# Patient Record
Sex: MALE | Race: BLACK OR AFRICAN AMERICAN | NOT HISPANIC OR LATINO | Employment: UNEMPLOYED | URBAN - METROPOLITAN AREA
[De-identification: names, ages, dates, MRNs, and addresses within clinical notes are randomized per-mention and may not be internally consistent; named-entity substitution may affect disease eponyms.]

---

## 2024-06-25 ENCOUNTER — HOSPITAL ENCOUNTER (EMERGENCY)
Facility: HOSPITAL | Age: 24
Discharge: HOME | End: 2024-06-25
Attending: EMERGENCY MEDICINE
Payer: COMMERCIAL

## 2024-06-25 VITALS
HEART RATE: 97 BPM | HEIGHT: 67 IN | TEMPERATURE: 98.6 F | WEIGHT: 125.66 LBS | BODY MASS INDEX: 19.72 KG/M2 | RESPIRATION RATE: 16 BRPM | SYSTOLIC BLOOD PRESSURE: 108 MMHG | DIASTOLIC BLOOD PRESSURE: 60 MMHG | OXYGEN SATURATION: 100 %

## 2024-06-25 DIAGNOSIS — R22.0 FACIAL SWELLING: ICD-10-CM

## 2024-06-25 DIAGNOSIS — K04.7 DENTAL ABSCESS: Primary | ICD-10-CM

## 2024-06-25 PROCEDURE — 2500000004 HC RX 250 GENERAL PHARMACY W/ HCPCS (ALT 636 FOR OP/ED): Performed by: EMERGENCY MEDICINE

## 2024-06-25 PROCEDURE — 99283 EMERGENCY DEPT VISIT LOW MDM: CPT | Mod: 25

## 2024-06-25 PROCEDURE — 2500000001 HC RX 250 WO HCPCS SELF ADMINISTERED DRUGS (ALT 637 FOR MEDICARE OP): Performed by: EMERGENCY MEDICINE

## 2024-06-25 PROCEDURE — 96372 THER/PROPH/DIAG INJ SC/IM: CPT | Performed by: EMERGENCY MEDICINE

## 2024-06-25 RX ORDER — CLINDAMYCIN HYDROCHLORIDE 150 MG/1
150 CAPSULE ORAL 3 TIMES DAILY
Qty: 30 CAPSULE | Refills: 0 | Status: SHIPPED | OUTPATIENT
Start: 2024-06-25 | End: 2024-07-05

## 2024-06-25 RX ORDER — DEXAMETHASONE SODIUM PHOSPHATE 100 MG/10ML
10 INJECTION INTRAMUSCULAR; INTRAVENOUS ONCE
Status: COMPLETED | OUTPATIENT
Start: 2024-06-25 | End: 2024-06-25

## 2024-06-25 RX ORDER — CLINDAMYCIN HYDROCHLORIDE 300 MG/1
300 CAPSULE ORAL ONCE
Status: COMPLETED | OUTPATIENT
Start: 2024-06-25 | End: 2024-06-25

## 2024-06-25 RX ORDER — IBUPROFEN 400 MG/1
400 TABLET ORAL ONCE
Status: COMPLETED | OUTPATIENT
Start: 2024-06-25 | End: 2024-06-25

## 2024-06-25 RX ORDER — METHYLPREDNISOLONE 4 MG/1
TABLET ORAL
Qty: 21 TABLET | Refills: 0 | Status: SHIPPED | OUTPATIENT
Start: 2024-06-25 | End: 2024-07-02

## 2024-06-25 ASSESSMENT — COLUMBIA-SUICIDE SEVERITY RATING SCALE - C-SSRS
1. IN THE PAST MONTH, HAVE YOU WISHED YOU WERE DEAD OR WISHED YOU COULD GO TO SLEEP AND NOT WAKE UP?: NO
2. HAVE YOU ACTUALLY HAD ANY THOUGHTS OF KILLING YOURSELF?: NO
6. HAVE YOU EVER DONE ANYTHING, STARTED TO DO ANYTHING, OR PREPARED TO DO ANYTHING TO END YOUR LIFE?: NO

## 2024-06-25 ASSESSMENT — PAIN DESCRIPTION - PROGRESSION: CLINICAL_PROGRESSION: NOT CHANGED

## 2024-06-25 ASSESSMENT — PAIN DESCRIPTION - LOCATION: LOCATION: FACE

## 2024-06-25 ASSESSMENT — PAIN DESCRIPTION - ORIENTATION: ORIENTATION: LEFT

## 2024-06-25 ASSESSMENT — PAIN DESCRIPTION - ONSET: ONSET: AWAKENED FROM SLEEP

## 2024-06-25 ASSESSMENT — PAIN DESCRIPTION - PAIN TYPE: TYPE: ACUTE PAIN

## 2024-06-25 ASSESSMENT — PAIN - FUNCTIONAL ASSESSMENT: PAIN_FUNCTIONAL_ASSESSMENT: 0-10

## 2024-06-25 ASSESSMENT — PAIN DESCRIPTION - FREQUENCY: FREQUENCY: CONSTANT/CONTINUOUS

## 2024-06-25 ASSESSMENT — PAIN DESCRIPTION - DESCRIPTORS: DESCRIPTORS: ACHING

## 2024-06-25 ASSESSMENT — PAIN SCALES - GENERAL: PAINLEVEL_OUTOF10: 6

## 2024-06-25 NOTE — DISCHARGE INSTRUCTIONS
Follow-up with your primary care physician as needed    Follow-up with your dentist today as previously scheduled    Return to the emergency department sooner with worsening of symptoms or onset of new symptoms      You may take over-the-counter acetaminophen and/or ibuprofen as needed for symptom relief      These are low-cost dental care locations    #1 [facility] phone number [phone]    #2 [facility] [phone]    #3 [facility] [phone]    #4 [facility] [phone]    #5 [facility] [phone]    Outside [facility]: [email or web address].

## 2024-06-25 NOTE — Clinical Note
John Welch was seen and treated in our emergency department on 6/25/2024.  He may return to work on 06/26/2024.       If you have any questions or concerns, please don't hesitate to call.      Guera Lombardi MD

## 2024-06-25 NOTE — ED PROVIDER NOTES
HPI   Chief Complaint   Patient presents with    Oral Swelling     Left sided dental abscess since yesterday       HPI                    Kathi Coma Scale Score: 15                     Patient History   No past medical history on file.  No past surgical history on file.  No family history on file.  Social History     Tobacco Use    Smoking status: Not on file    Smokeless tobacco: Not on file   Substance Use Topics    Alcohol use: Not on file    Drug use: Not on file       Physical Exam   ED Triage Vitals [06/25/24 0834]   Temperature Heart Rate Respirations BP   37 °C (98.6 °F) 97 16 --      Pulse Ox Temp Source Heart Rate Source Patient Position   100 % Oral Monitor Sitting      BP Location FiO2 (%)     Right arm --       Physical Exam    ED Course & MDM   Diagnoses as of 06/25/24 0844   Dental abscess   Facial swelling       Medical Decision Making    The patient is a 24-year-old male presenting to the emergency department for evaluation of a dental abscess.  He states he does have some chronic dental decay and tooth pain.  He states he primarily has pain in the left bottom rear molar.  He states it started worsening yesterday so he made an appointment with the dentist today at noon.  He states that he came to the emergency room for evaluation now because he has had increasing facial swelling overnight.  He states it is on the left side.  He is able to eat, drink and speak without difficulty.  No headache or visual changes.  No fever or chills.  No cough or congestion.  No chest pain or shortness of breath.  No nausea, vomiting or diarrhea.  No urinary complaints.  All pertinent positives and negatives are recorded above.  All other systems reviewed and otherwise negative.  Vital signs within normal limits.  Physical exam with a well-nourished well-developed male in no acute distress.  HEENT exam with edema on the left side of the face and erosion of the left bottom rear molar.  No pooling of secretions.  No  stridor.  He is able to converse without difficulty.  He does not have any evidence of airway compromise or respiratory distress.  Abdominal exam is benign.  He has no gross motor, neurologic or vascular deficits on exam.      No indication for emergent imaging and/or diagnostic testing at this time.      Oral clindamycin, oral ibuprofen and IM Decadron was ordered.      The patient was released in good condition with a prescription for clindamycin and Medrol Dosepak.  He was instructed to follow-up with his dentist today as scheduled.  He was also given a referral to the on-call dentist and a list of low-cost dental providers in the area.  He will return to the emergency department sooner with worsening of symptoms or onset of new symptoms.      Impression/diagnosis  Dental abscess, left lower rear molar  Facial swelling, left-sided      Critical care time billing is not warranted at this time    Procedure  Procedures     Guera Lombardi MD  06/25/24 0812       Guera Lombardi MD  06/25/24 0173